# Patient Record
Sex: FEMALE | Race: WHITE | Employment: FULL TIME | URBAN - METROPOLITAN AREA
[De-identification: names, ages, dates, MRNs, and addresses within clinical notes are randomized per-mention and may not be internally consistent; named-entity substitution may affect disease eponyms.]

---

## 2021-02-04 LAB — PAP SMEAR, EXTERNAL: NORMAL

## 2022-08-27 LAB
CREATININE, EXTERNAL: 0.79
HBA1C MFR BLD HPLC: 4.8 %
LDL-C, EXTERNAL: 92
TOTAL CHOLESTEROL, NCHOLT: 153

## 2023-01-31 ENCOUNTER — OFFICE VISIT (OUTPATIENT)
Dept: INTERNAL MEDICINE CLINIC | Age: 28
End: 2023-01-31
Payer: COMMERCIAL

## 2023-01-31 VITALS
RESPIRATION RATE: 16 BRPM | HEART RATE: 140 BPM | WEIGHT: 186.13 LBS | OXYGEN SATURATION: 99 % | TEMPERATURE: 98.6 F | BODY MASS INDEX: 31.78 KG/M2 | DIASTOLIC BLOOD PRESSURE: 80 MMHG | SYSTOLIC BLOOD PRESSURE: 130 MMHG | HEIGHT: 64 IN

## 2023-01-31 DIAGNOSIS — F33.2 ENDOGENOUS DEPRESSION (HCC): ICD-10-CM

## 2023-01-31 DIAGNOSIS — K58.2 IRRITABLE BOWEL SYNDROME WITH BOTH CONSTIPATION AND DIARRHEA: ICD-10-CM

## 2023-01-31 DIAGNOSIS — I10 ESSENTIAL HYPERTENSION: ICD-10-CM

## 2023-01-31 DIAGNOSIS — R16.1 SPLENOMEGALY: ICD-10-CM

## 2023-01-31 DIAGNOSIS — R59.9 NODULAR LYMPHOID HYPERPLASIA: ICD-10-CM

## 2023-01-31 DIAGNOSIS — R00.0 SINUS TACHYCARDIA: Primary | ICD-10-CM

## 2023-01-31 DIAGNOSIS — D80.2 IGA DEFICIENCY (HCC): ICD-10-CM

## 2023-01-31 PROBLEM — K58.9 IBS (IRRITABLE BOWEL SYNDROME): Status: ACTIVE | Noted: 2023-01-31

## 2023-01-31 PROCEDURE — 3079F DIAST BP 80-89 MM HG: CPT | Performed by: INTERNAL MEDICINE

## 2023-01-31 PROCEDURE — 3075F SYST BP GE 130 - 139MM HG: CPT | Performed by: INTERNAL MEDICINE

## 2023-01-31 PROCEDURE — 99214 OFFICE O/P EST MOD 30 MIN: CPT | Performed by: INTERNAL MEDICINE

## 2023-01-31 RX ORDER — LABETALOL 100 MG/1
100 TABLET, FILM COATED ORAL 2 TIMES DAILY
Qty: 180 TABLET | Refills: 1 | Status: SHIPPED | OUTPATIENT
Start: 2023-01-31

## 2023-01-31 RX ORDER — BUPROPION HYDROCHLORIDE 150 MG/1
150 TABLET ORAL DAILY
COMMUNITY
End: 2023-01-31 | Stop reason: SDUPTHER

## 2023-01-31 RX ORDER — NIFEDIPINE 60 MG/1
60 TABLET, EXTENDED RELEASE ORAL DAILY
COMMUNITY

## 2023-01-31 RX ORDER — ALBUTEROL SULFATE 90 UG/1
2 AEROSOL, METERED RESPIRATORY (INHALATION)
COMMUNITY

## 2023-01-31 RX ORDER — ONDANSETRON 4 MG/1
4 TABLET, FILM COATED ORAL
COMMUNITY

## 2023-01-31 RX ORDER — LABETALOL 100 MG/1
100 TABLET, FILM COATED ORAL 2 TIMES DAILY
COMMUNITY
End: 2023-01-31 | Stop reason: SDUPTHER

## 2023-01-31 RX ORDER — BUPROPION HYDROCHLORIDE 150 MG/1
150 TABLET ORAL DAILY
Qty: 90 TABLET | Refills: 1 | Status: SHIPPED | OUTPATIENT
Start: 2023-01-31

## 2023-01-31 NOTE — PROGRESS NOTES
[address] [facility] Internal Medicine  [address]  [place], [address] [facility]  Phone: [phone]      Nazanin Lind (: [birth year]) is a 28 y.o. female, established patient, here for evaluation of the following chief complaint(s):  Hospital Follow Up [facility]; elevated heart rate/ SOB.)         SUBJECTIVE/OBJECTIVE:  HPI:  Nazanin Lind is being seen today for ER follow up. She went to [facility] last week for elevated heart rate with SOB. She states she went to [facility] and then was sent to ER due to heart rate not going down. She states she feels ok. She does have a small headache. She is trying to cut out caffeine so is unsure if that is what the problem is. She needs refills on Wellbutrin and Labetalol. Has not been taking labetalol for 2 3 months, planning to get pregnant. Patient also has been feeling like heart racing for the last couple of weeks. Patient states in the emergency room patient was given IV fluid heart rate initially was 148. It did come down to 88 patient had a COVID test and also influenza test which was negative. Chest x-ray was no evidence of acute abnormality. White count was 10.3. Hemoglobin 15. Platelet count 257. TSH was checked which was 3.03. Troponin was negative. Sodium was 137. Potassium 3.6. Blood sugar was 94. BUN/creatinine normal.  Urine analysis negative. Patient had a EKG which showed sinus tachycardia ventricular rate 118. No acute process. Blood pressure was elevated at 154/98 in the emergency room. Prior to Admission medications    Medication Sig Start Date End Date Taking? Authorizing Provider   ondansetron hcl (ZOFRAN) 4 mg tablet Take 4 mg by mouth every six (6) hours as needed for Nausea or Vomiting. Yes Provider, Historical   albuterol (PROVENTIL HFA, VENTOLIN HFA, PROAIR HFA) 90 mcg/actuation inhaler Take 2 Puffs by inhalation.    Yes Provider, Historical   NIFEdipine ER (PROCARDIA XL) 60 mg ER tablet Take 60 mg by mouth daily. Yes Provider, Historical   buPROPion XL (WELLBUTRIN XL) 150 mg tablet Take 1 Tablet by mouth daily. 1/31/23  Yes Delmi Causey MD   labetaloL (NORMODYNE) 100 mg tablet Take 1 Tablet by mouth two (2) times a day. 1/31/23  Yes Delmi Causey MD        No Known Allergies     Past Medical History:   Diagnosis Date    Depression, endogenous ([facility] [address]     Hypertension, diastolic     IBS (irritable bowel syndrome)     IgA deficiency ([facility] [address]     Nodular lymphoid hyperplasia     Splenomegaly         Family History   Problem Relation Age of Onset    Diabetes Maternal Grandmother     Heart Disease Maternal Grandfather     Diabetes Maternal Grandfather     Heart Disease Paternal Grandmother     Diabetes Paternal Grandmother     Leukemia Paternal Grandmother     Heart Disease Paternal Grandfather         History reviewed. No pertinent surgical history. Review of Systems   Constitutional:  Negative for chills and fever. HENT:  Negative for congestion, ear pain, nosebleeds, sinus pain, sore throat and tinnitus. Eyes:  Negative for redness. Respiratory:  Negative for cough and shortness of breath. Cardiovascular:  Positive for palpitations. Negative for chest pain. Gastrointestinal:  Positive for constipation and diarrhea. Negative for abdominal pain, nausea and vomiting. Endocrine: Negative for cold intolerance and polyuria. Genitourinary:  Negative for dysuria and hematuria. Musculoskeletal:  Negative for back pain and neck pain. Skin:  Negative for rash. Neurological:  Negative for dizziness and headaches. Psychiatric/Behavioral: Negative. /80 (BP Patient Position: Standing)   Pulse (!) 140   Temp 98.6 °F (37 °C) (Temporal)   Resp 16   Ht 5' 4\" (1.626 m)   Wt 186 lb 2 oz (84.4 kg)   SpO2 99%   BMI 31.95 kg/m²      Physical Exam  Vitals reviewed. Exam conducted with a chaperone present. Gen:  Not  in no acute distress. Well built and nourished.   HEENT:  Pink conjunctivae, SENTHIL, EOMI, hearing intact . Mouth: Moist mucous membranes. No TPC  Neck:  Supple, without masses, thyroid not enlarged  Resp:  No accessory muscle use, clear breath sounds without wheezes rales or rhonchi  Card:  No murmurs, normal S1, S2 without thrills, tachycardic, no bruits or peripheral edema  Abd:  Soft, non-tender, non-distended, normoactive bowel sounds are present, no palpable organomegaly and no detectable hernias  Lymph:  No cervical or inguinal adenopathy  Musc:  No cyanosis or clubbing. Gait: Normal  Skin:  No rashes or ulcers, skin turgor is good  Neuro: Alert and oriented x 3. Cranial nerves are grossly intact, no focal motor weakness, follows commands appropriately  Psych:  Good insight, mood normal.      ASSESSMENT/PLAN:  Below is the assessment and plan developed based on review of pertinent history, physical exam, labs, studies, and medications. 1. Sinus tachycardia  Comments:  R/o POTS. Offered apt with cardiologist. Wants to wait. Will restart labetalol. Emergency room records reviewed. Orders:  -     labetaloL (NORMODYNE) 100 mg tablet; Take 1 Tablet by mouth two (2) times a day., Normal, Disp-180 Tablet, R-1  2. Essential hypertension  Comments:  Blood pressure controlled today. If heart rate stays high can increase labetalol to 200 mg twice a day and come off Procardia. Advised to monitor blood pressu  3. Irritable bowel syndrome with both constipation and diarrhea  Comments:  Seems like a ongoing problem. Advised follow-up appointment with gastroenterologist.  4. Splenomegaly  Comments:  Most likely secondary to nodular lymphoid hyperplasia. Patient had ultrasound 9/22.  5. Endogenous depression ([facility] [address]  Comments:  Patient states she has been getting depressed recently. We will place her on Wellbutrin 150 mg. Orders:  -     buPROPion XL (WELLBUTRIN XL) 150 mg tablet; Take 1 Tablet by mouth daily. , Normal, Disp-90 Tablet, R-1  6. IgA deficiency ([facility] [address]  7.  Nodular lymphoid hyperplasia    Return in about 4 weeks (around 2/28/2023) for follow up. Patient Instructions   Advised to monitor blood pressure at home. If blood pressure systolic is less than 799, to hold nifedipine. Can increase labetalol to 200 mg twice a day if heart rate is still staying high. Health Maintenance Due   Topic Date Due    Hepatitis C Screening  Never done    Depression Monitoring  Never done    DTaP/Tdap/Td series (1 - Tdap) Never done    Pap Smear  Never done    COVID-19 Vaccine (4 - Booster for Pfizer series) 03/26/2022        Aspects of this note may have been generated using voice recognition software. Despite editing, there may be unrecognized errors. An electronic signature was used to authenticate this note.   -- Cass Nazario MD

## 2023-01-31 NOTE — PATIENT INSTRUCTIONS
Advised to monitor blood pressure at home. If blood pressure systolic is less than 933, to hold nifedipine. Can increase labetalol to 200 mg twice a day if heart rate is still staying high.

## 2023-01-31 NOTE — PROGRESS NOTES
1. \"Have you been to the ER, urgent care clinic since your last visit? Hospitalized since your last visit? \" Yes Where: [facility], SOB and elevated heart rate    2. \"Have you seen or consulted any other health care providers outside of the [address] [place] [address] since your last visit? \" No

## 2023-08-14 DIAGNOSIS — R00.0 TACHYCARDIA, UNSPECIFIED: ICD-10-CM

## 2023-08-14 RX ORDER — LABETALOL 100 MG/1
TABLET, FILM COATED ORAL
Qty: 180 TABLET | Refills: 0 | Status: SHIPPED | OUTPATIENT
Start: 2023-08-14

## 2023-08-15 DIAGNOSIS — F33.2 MAJOR DEPRESSIVE DISORDER, RECURRENT SEVERE WITHOUT PSYCHOTIC FEATURES (HCC): ICD-10-CM

## 2023-08-15 RX ORDER — BUPROPION HYDROCHLORIDE 150 MG/1
TABLET ORAL
Qty: 90 TABLET | Refills: 0 | Status: SHIPPED | OUTPATIENT
Start: 2023-08-15

## 2023-11-20 DIAGNOSIS — R00.0 TACHYCARDIA, UNSPECIFIED: ICD-10-CM

## 2023-11-20 RX ORDER — LABETALOL 100 MG/1
100 TABLET, FILM COATED ORAL 2 TIMES DAILY
Qty: 60 TABLET | Refills: 0 | Status: SHIPPED | OUTPATIENT
Start: 2023-11-20 | End: 2023-12-19 | Stop reason: SDUPTHER

## 2023-11-21 DIAGNOSIS — F33.2 MAJOR DEPRESSIVE DISORDER, RECURRENT SEVERE WITHOUT PSYCHOTIC FEATURES (HCC): ICD-10-CM

## 2023-11-21 RX ORDER — BUPROPION HYDROCHLORIDE 150 MG/1
TABLET ORAL
Qty: 30 TABLET | Refills: 0 | Status: SHIPPED | OUTPATIENT
Start: 2023-11-21 | End: 2023-12-19 | Stop reason: SDUPTHER

## 2023-12-20 DIAGNOSIS — F33.2 MAJOR DEPRESSIVE DISORDER, RECURRENT SEVERE WITHOUT PSYCHOTIC FEATURES (HCC): ICD-10-CM

## 2023-12-20 RX ORDER — BUPROPION HYDROCHLORIDE 150 MG/1
150 TABLET ORAL DAILY
Qty: 90 TABLET | Refills: 0 | OUTPATIENT
Start: 2023-12-20

## 2024-01-08 ENCOUNTER — OFFICE VISIT (OUTPATIENT)
Facility: CLINIC | Age: 29
End: 2024-01-08
Payer: COMMERCIAL

## 2024-01-08 VITALS
RESPIRATION RATE: 16 BRPM | OXYGEN SATURATION: 99 % | DIASTOLIC BLOOD PRESSURE: 87 MMHG | BODY MASS INDEX: 36.19 KG/M2 | HEIGHT: 64 IN | HEART RATE: 83 BPM | TEMPERATURE: 98.4 F | SYSTOLIC BLOOD PRESSURE: 125 MMHG | WEIGHT: 212 LBS

## 2024-01-08 DIAGNOSIS — R59.9 NODULAR LYMPHOID HYPERPLASIA: ICD-10-CM

## 2024-01-08 DIAGNOSIS — D80.2 IGA DEFICIENCY (HCC): ICD-10-CM

## 2024-01-08 DIAGNOSIS — F33.2 DEPRESSION, ENDOGENOUS (HCC): ICD-10-CM

## 2024-01-08 DIAGNOSIS — R16.1 SPLENOMEGALY: ICD-10-CM

## 2024-01-08 DIAGNOSIS — Z11.59 NEED FOR HEPATITIS C SCREENING TEST: ICD-10-CM

## 2024-01-08 DIAGNOSIS — I10 HYPERTENSION, DIASTOLIC: Primary | ICD-10-CM

## 2024-01-08 DIAGNOSIS — K58.2 IRRITABLE BOWEL SYNDROME WITH BOTH CONSTIPATION AND DIARRHEA: ICD-10-CM

## 2024-01-08 DIAGNOSIS — M25.50 POLYARTHRALGIA: ICD-10-CM

## 2024-01-08 DIAGNOSIS — Z00.01 ENCOUNTER FOR PREVENTATIVE ADULT HEALTH CARE EXAM WITH ABNORMAL FINDINGS: ICD-10-CM

## 2024-01-08 PROCEDURE — 3074F SYST BP LT 130 MM HG: CPT | Performed by: INTERNAL MEDICINE

## 2024-01-08 PROCEDURE — 99214 OFFICE O/P EST MOD 30 MIN: CPT | Performed by: INTERNAL MEDICINE

## 2024-01-08 PROCEDURE — 3079F DIAST BP 80-89 MM HG: CPT | Performed by: INTERNAL MEDICINE

## 2024-01-08 SDOH — ECONOMIC STABILITY: FOOD INSECURITY: WITHIN THE PAST 12 MONTHS, THE FOOD YOU BOUGHT JUST DIDN'T LAST AND YOU DIDN'T HAVE MONEY TO GET MORE.: NEVER TRUE

## 2024-01-08 SDOH — ECONOMIC STABILITY: FOOD INSECURITY: WITHIN THE PAST 12 MONTHS, YOU WORRIED THAT YOUR FOOD WOULD RUN OUT BEFORE YOU GOT MONEY TO BUY MORE.: NEVER TRUE

## 2024-01-08 SDOH — ECONOMIC STABILITY: HOUSING INSECURITY
IN THE LAST 12 MONTHS, WAS THERE A TIME WHEN YOU DID NOT HAVE A STEADY PLACE TO SLEEP OR SLEPT IN A SHELTER (INCLUDING NOW)?: NO

## 2024-01-08 SDOH — ECONOMIC STABILITY: INCOME INSECURITY: HOW HARD IS IT FOR YOU TO PAY FOR THE VERY BASICS LIKE FOOD, HOUSING, MEDICAL CARE, AND HEATING?: NOT HARD AT ALL

## 2024-01-08 ASSESSMENT — PATIENT HEALTH QUESTIONNAIRE - PHQ9
9. THOUGHTS THAT YOU WOULD BE BETTER OFF DEAD, OR OF HURTING YOURSELF: 0
SUM OF ALL RESPONSES TO PHQ QUESTIONS 1-9: 13
7. TROUBLE CONCENTRATING ON THINGS, SUCH AS READING THE NEWSPAPER OR WATCHING TELEVISION: 2
5. POOR APPETITE OR OVEREATING: 1
6. FEELING BAD ABOUT YOURSELF - OR THAT YOU ARE A FAILURE OR HAVE LET YOURSELF OR YOUR FAMILY DOWN: 1
SUM OF ALL RESPONSES TO PHQ QUESTIONS 1-9: 13
8. MOVING OR SPEAKING SO SLOWLY THAT OTHER PEOPLE COULD HAVE NOTICED. OR THE OPPOSITE, BEING SO FIGETY OR RESTLESS THAT YOU HAVE BEEN MOVING AROUND A LOT MORE THAN USUAL: 0
10. IF YOU CHECKED OFF ANY PROBLEMS, HOW DIFFICULT HAVE THESE PROBLEMS MADE IT FOR YOU TO DO YOUR WORK, TAKE CARE OF THINGS AT HOME, OR GET ALONG WITH OTHER PEOPLE: 2
3. TROUBLE FALLING OR STAYING ASLEEP: 3
SUM OF ALL RESPONSES TO PHQ9 QUESTIONS 1 & 2: 3
4. FEELING TIRED OR HAVING LITTLE ENERGY: 3
1. LITTLE INTEREST OR PLEASURE IN DOING THINGS: 2
2. FEELING DOWN, DEPRESSED OR HOPELESS: 1

## 2024-01-08 ASSESSMENT — ENCOUNTER SYMPTOMS
CONSTIPATION: 1
DIARRHEA: 1
COUGH: 0
VOMITING: 0
NAUSEA: 1
ABDOMINAL PAIN: 0
SHORTNESS OF BREATH: 0

## 2024-01-08 NOTE — PROGRESS NOTES
Chief Complaint   Patient presents with    Fever     started yesterday    Sore Throat    Generalized Body Aches     she is a 9y.o. year old female who presents for evalution. Symptoms started yesterday fever and body aches. she has not coughed while I the office today  She is acting normal today  There is a lot of flu in her school so mom brought her to be tested     Reviewed PmHx, RxHx, FmHx, SocHx, AllgHx and updated and dated in the chart. Aspirin yes ____   No____ N/A____    Patient Active Problem List    Diagnosis    History of strep pharyngitis    Environmental and seasonal allergies    Dehydration     Acute Gastroenteritis and hospitalization 4/2016      GERD (gastroesophageal reflux disease)       Nurse notes were reviewed and copied and are correct  Review of Systems - negative except as listed above in the HPI    Objective:     Vitals:    03/02/19 1013   BP: 119/78   Pulse: 106   Resp: 22   Temp: 99 °F (37.2 °C)   TempSrc: Oral   SpO2: 99%   Weight: 53 lb (24 kg)   Height: (!) 4' 0.43\" (1.23 m)     Physical Examination: General appearance - alert, well appearing, and in no distress  Mental status - alert, oriented to person, place, and time  Ears - bilateral TM's and external ear canals normal  Mouth - mucous membranes moist, pharynx normal without lesions  Neck - supple, no significant adenopathy  Chest - clear to auscultation, no wheezes, rales or rhonchi, symmetric air entry  Heart - normal rate, regular rhythm, normal S1, S2, no murmurs, rubs, clicks or gallops         Assessment/ Plan:   Diagnoses and all orders for this visit:    1. Fever, unspecified fever cause  -     AMB POC RAPID STREP A  -     AMB POC NASIM INFLUENZA A/B TEST     both tests were negative  No sign of bacterial infections  No fevetoday. I told mom to treat symptoms   rto if fever returns   Follow-up Disposition: Not on File    ICD-10-CM ICD-9-CM    1.  Fever, unspecified fever cause R50.9 780.60 AMB POC RAPID STREP A [facility]  [address], [place] [address]  Phone: [phone]      Shalonda Gann (: 1995) is a 28 y.o. female, established patient, here for evaluation of the following chief complaint(s):  FMLA forms, Follow-up Chronic Condition, and Hypertension         SUBJECTIVE/OBJECTIVE:  HPI:  This is a 28-year-old female with past medical history of hypertension, IgA deficiency, nodular lymphoid hyperplasia, splenomegaly, IBS, endogenous depression here for 1 year follow-up.Also needs to have  FMLA forms filled out. She says that she has stomach issues and some days she have diarrhea and other days it is constipation. She states that she also has days that she vomits. She says that it is almost time for another endoscopy. She states that she needs intermittent leave for flare ups. She states that she has flare ups 7 days a month 8 hours and also needs 15 minute breaks 3 x a day. Patient states that she is having pain in her hands and she pops her fingers a lot and they get stuck sometimes. She needs a refills on all medications and needs 90 day supply.LMP 2024        Current Outpatient Medications   Medication Sig    buPROPion (WELLBUTRIN XL) 150 MG extended release tablet Take 1 tablet by mouth daily    labetalol (NORMODYNE) 100 MG tablet Take 1 tablet by mouth 2 times daily    albuterol sulfate HFA (PROVENTIL;VENTOLIN;PROAIR) 108 (90 Base) MCG/ACT inhaler Inhale 2 puffs into the lungs    ondansetron (ZOFRAN) 4 MG tablet Take 1 tablet by mouth every 6 hours as needed     No current facility-administered medications for this visit.       No Known Allergies     Past Medical History:   Diagnosis Date    Depression, endogenous (HCC)     Hypertension, diastolic     IBS (irritable bowel syndrome)     IgA deficiency (HCC)     Nodular lymphoid hyperplasia     Splenomegaly         Family History   Problem Relation Age of Onset    Heart Disease Paternal  AMB POC NASIM INFLUENZA A/B TEST       I have discussed the diagnosis with the patient and the intended plan as seen in the above orders. The patient has received an after-visit summary and questions were answered concerning future plans. Medication Side Effects and Warnings were discussed with patient: yes  Patient Labs were reviewed and or requested: yes  Patient Past Records were reviewed and or requested: yes        There are no Patient Instructions on file for this visit.     The patient verbalizes understanding and agrees with the plan of care        Patient has the advanced directives booklet to review

## 2024-01-08 NOTE — PATIENT INSTRUCTIONS
Patient advised to have low sodium diet, daily exercise, and increase daily fruit and vegetable intake.   Importance of weight reduction discussed with patient.

## 2024-01-14 LAB
ALBUMIN SERPL-MCNC: 4.7 G/DL (ref 4–5)
ALBUMIN/GLOB SERPL: 2 {RATIO} (ref 1.2–2.2)
ALP SERPL-CCNC: 73 IU/L (ref 44–121)
ALT SERPL-CCNC: 7 IU/L (ref 0–32)
AST SERPL-CCNC: 9 IU/L (ref 0–40)
BASOPHILS # BLD AUTO: 0 X10E3/UL (ref 0–0.2)
BASOPHILS NFR BLD AUTO: 1 %
BILIRUB SERPL-MCNC: 0.3 MG/DL (ref 0–1.2)
BUN SERPL-MCNC: 10 MG/DL (ref 6–20)
BUN/CREAT SERPL: 14 (ref 9–23)
CALCIUM SERPL-MCNC: 9.7 MG/DL (ref 8.7–10.2)
CHLORIDE SERPL-SCNC: 104 MMOL/L (ref 96–106)
CHOLEST SERPL-MCNC: 155 MG/DL (ref 100–199)
CO2 SERPL-SCNC: 23 MMOL/L (ref 20–29)
CREAT SERPL-MCNC: 0.72 MG/DL (ref 0.57–1)
EGFRCR SERPLBLD CKD-EPI 2021: 116 ML/MIN/1.73
EOSINOPHIL # BLD AUTO: 0.2 X10E3/UL (ref 0–0.4)
EOSINOPHIL NFR BLD AUTO: 3 %
ERYTHROCYTE [DISTWIDTH] IN BLOOD BY AUTOMATED COUNT: 12.2 % (ref 11.7–15.4)
ERYTHROCYTE [SEDIMENTATION RATE] IN BLOOD BY WESTERGREN METHOD: 2 MM/HR (ref 0–32)
GLOBULIN SER CALC-MCNC: 2.3 G/DL (ref 1.5–4.5)
GLUCOSE SERPL-MCNC: 92 MG/DL (ref 70–99)
HCT VFR BLD AUTO: 39.7 % (ref 34–46.6)
HDLC SERPL-MCNC: 36 MG/DL
HGB BLD-MCNC: 13.2 G/DL (ref 11.1–15.9)
IMM GRANULOCYTES # BLD AUTO: 0 X10E3/UL (ref 0–0.1)
IMM GRANULOCYTES NFR BLD AUTO: 0 %
LDLC SERPL CALC-MCNC: 94 MG/DL (ref 0–99)
LYMPHOCYTES # BLD AUTO: 1.6 X10E3/UL (ref 0.7–3.1)
LYMPHOCYTES NFR BLD AUTO: 30 %
MCH RBC QN AUTO: 29.5 PG (ref 26.6–33)
MCHC RBC AUTO-ENTMCNC: 33.2 G/DL (ref 31.5–35.7)
MCV RBC AUTO: 89 FL (ref 79–97)
MONOCYTES # BLD AUTO: 0.3 X10E3/UL (ref 0.1–0.9)
MONOCYTES NFR BLD AUTO: 5 %
NEUTROPHILS # BLD AUTO: 3.4 X10E3/UL (ref 1.4–7)
NEUTROPHILS NFR BLD AUTO: 61 %
PLATELET # BLD AUTO: 296 X10E3/UL (ref 150–450)
POTASSIUM SERPL-SCNC: 4.4 MMOL/L (ref 3.5–5.2)
RBC # BLD AUTO: 4.48 X10E6/UL (ref 3.77–5.28)
SODIUM SERPL-SCNC: 140 MMOL/L (ref 134–144)
TRIGL SERPL-MCNC: 138 MG/DL (ref 0–149)
TSH SERPL DL<=0.005 MIU/L-ACNC: 2.57 UIU/ML (ref 0.45–4.5)
VLDLC SERPL CALC-MCNC: 25 MG/DL (ref 5–40)
WBC # BLD AUTO: 5.5 X10E3/UL (ref 3.4–10.8)

## 2024-01-15 LAB
APPEARANCE UR: CLEAR
BACTERIA #/AREA URNS HPF: NORMAL /[HPF]
BILIRUB UR QL STRIP: NEGATIVE
CASTS URNS QL MICRO: NORMAL /LPF
COLOR UR: YELLOW
CRP SERPL-MCNC: 3 MG/L (ref 0–10)
EPI CELLS #/AREA URNS HPF: NORMAL /HPF (ref 0–10)
GLUCOSE UR QL STRIP: NEGATIVE
HBA1C MFR BLD: 5 % (ref 4.8–5.6)
HGB UR QL STRIP: NEGATIVE
KETONES UR QL STRIP: NEGATIVE
LEUKOCYTE ESTERASE UR QL STRIP: NEGATIVE
MICRO URNS: NORMAL
MICRO URNS: NORMAL
NITRITE UR QL STRIP: NEGATIVE
PH UR STRIP: 6.5 [PH] (ref 5–7.5)
PROT UR QL STRIP: NEGATIVE
RBC #/AREA URNS HPF: NORMAL /HPF (ref 0–2)
SP GR UR STRIP: 1.02 (ref 1–1.03)
UROBILINOGEN UR STRIP-MCNC: 0.2 MG/DL (ref 0.2–1)
WBC #/AREA URNS HPF: NORMAL /HPF (ref 0–5)

## 2024-01-16 LAB
ANA SER QL IF: NEGATIVE
CCP IGA+IGG SERPL IA-ACNC: 9 UNITS (ref 0–19)
LABORATORY COMMENT REPORT: NORMAL

## 2024-01-19 LAB
ALBUMIN SERPL ELPH-MCNC: 4.3 G/DL (ref 2.9–4.4)
ALBUMIN/GLOB SERPL: 1.6 {RATIO} (ref 0.7–1.7)
ALPHA1 GLOB SERPL ELPH-MCNC: 0.2 G/DL (ref 0–0.4)
ALPHA2 GLOB SERPL ELPH-MCNC: 0.6 G/DL (ref 0.4–1)
B-GLOBULIN SERPL ELPH-MCNC: 0.9 G/DL (ref 0.7–1.3)
GAMMA GLOB SERPL ELPH-MCNC: 1 G/DL (ref 0.4–1.8)
GLOBULIN SER-MCNC: 2.7 G/DL (ref 2.2–3.9)
IGA SERPL-MCNC: <5 MG/DL (ref 87–352)
IGG SERPL-MCNC: 1037 MG/DL (ref 586–1602)
IGM SERPL-MCNC: 42 MG/DL (ref 26–217)
INTERPRETATION SERPL IEP-IMP: ABNORMAL
KAPPA LC FREE SER-MCNC: 8.6 MG/L (ref 3.3–19.4)
KAPPA LC FREE/LAMBDA FREE SER: 1.28 {RATIO} (ref 0.26–1.65)
LABORATORY COMMENT REPORT: ABNORMAL
LAMBDA LC FREE SERPL-MCNC: 6.7 MG/L (ref 5.7–26.3)
M PROTEIN SERPL ELPH-MCNC: ABNORMAL G/DL
PROT SERPL-MCNC: 7 G/DL (ref 6–8.5)

## 2024-01-19 NOTE — RESULT ENCOUNTER NOTE
Please let patient know IgA level is less than 5.  She should get follow-up appointment with Dr. Temo LOPEZ.  Please tell her to call and make that appointment

## 2024-01-28 DIAGNOSIS — F33.2 MAJOR DEPRESSIVE DISORDER, RECURRENT SEVERE WITHOUT PSYCHOTIC FEATURES (HCC): ICD-10-CM

## 2024-01-28 DIAGNOSIS — R00.0 TACHYCARDIA, UNSPECIFIED: ICD-10-CM

## 2024-01-28 RX ORDER — BUPROPION HYDROCHLORIDE 150 MG/1
150 TABLET ORAL DAILY
Qty: 30 TABLET | Refills: 0 | Status: SHIPPED | OUTPATIENT
Start: 2024-01-28

## 2024-01-28 RX ORDER — LABETALOL 100 MG/1
100 TABLET, FILM COATED ORAL 2 TIMES DAILY
Qty: 60 TABLET | Refills: 0 | Status: SHIPPED | OUTPATIENT
Start: 2024-01-28

## 2024-02-19 ENCOUNTER — TELEPHONE (OUTPATIENT)
Facility: CLINIC | Age: 29
End: 2024-02-19

## 2024-02-19 ENCOUNTER — OFFICE VISIT (OUTPATIENT)
Facility: CLINIC | Age: 29
End: 2024-02-19
Payer: COMMERCIAL

## 2024-02-19 VITALS
OXYGEN SATURATION: 97 % | DIASTOLIC BLOOD PRESSURE: 96 MMHG | HEIGHT: 64 IN | WEIGHT: 205 LBS | BODY MASS INDEX: 35 KG/M2 | RESPIRATION RATE: 16 BRPM | SYSTOLIC BLOOD PRESSURE: 140 MMHG | HEART RATE: 98 BPM

## 2024-02-19 DIAGNOSIS — R11.2 NAUSEA AND VOMITING, UNSPECIFIED VOMITING TYPE: ICD-10-CM

## 2024-02-19 DIAGNOSIS — R59.9 NODULAR LYMPHOID HYPERPLASIA: ICD-10-CM

## 2024-02-19 DIAGNOSIS — F41.8 MIXED ANXIETY AND DEPRESSIVE DISORDER: ICD-10-CM

## 2024-02-19 DIAGNOSIS — R00.0 TACHYCARDIA, UNSPECIFIED: ICD-10-CM

## 2024-02-19 DIAGNOSIS — I10 ESSENTIAL HYPERTENSION, BENIGN: Primary | ICD-10-CM

## 2024-02-19 DIAGNOSIS — D80.2 IGA DEFICIENCY (HCC): ICD-10-CM

## 2024-02-19 PROCEDURE — 99214 OFFICE O/P EST MOD 30 MIN: CPT | Performed by: INTERNAL MEDICINE

## 2024-02-19 PROCEDURE — 3080F DIAST BP >= 90 MM HG: CPT | Performed by: INTERNAL MEDICINE

## 2024-02-19 PROCEDURE — 3075F SYST BP GE 130 - 139MM HG: CPT | Performed by: INTERNAL MEDICINE

## 2024-02-19 RX ORDER — PANTOPRAZOLE SODIUM 40 MG/1
40 TABLET, DELAYED RELEASE ORAL
Qty: 90 TABLET | Refills: 1 | Status: SHIPPED | OUTPATIENT
Start: 2024-02-19

## 2024-02-19 RX ORDER — LABETALOL 100 MG/1
100 TABLET, FILM COATED ORAL 3 TIMES DAILY
Qty: 90 TABLET | Refills: 3 | Status: SHIPPED | OUTPATIENT
Start: 2024-02-19

## 2024-02-19 RX ORDER — BUPROPION HYDROCHLORIDE 150 MG/1
150 TABLET ORAL DAILY
Qty: 30 TABLET | Refills: 0 | Status: SHIPPED | OUTPATIENT
Start: 2024-02-19 | End: 2024-02-20 | Stop reason: SDUPTHER

## 2024-02-19 RX ORDER — ONDANSETRON 4 MG/1
4 TABLET, FILM COATED ORAL EVERY 8 HOURS PRN
Qty: 45 TABLET | Refills: 1 | Status: SHIPPED | OUTPATIENT
Start: 2024-02-19

## 2024-02-19 ASSESSMENT — ENCOUNTER SYMPTOMS
ABDOMINAL PAIN: 0
DIARRHEA: 0
VOMITING: 1
NAUSEA: 1
SHORTNESS OF BREATH: 0
COUGH: 0

## 2024-02-19 NOTE — TELEPHONE ENCOUNTER
Pt states Dr. Plascencia left the practice in July. States they told her she could be referred to Dr. Tamez. Need new referral .SILVANO CUMMINGS MA

## 2024-02-19 NOTE — PROGRESS NOTES
1. \"Have you been to the ER, urgent care clinic since your last visit?  Hospitalized since your last visit?\" No    2. \"Have you seen or consulted any other health care providers outside of the [facility] since your last visit?\" No     3. For patients aged 45-75: Has the patient had a colonoscopy / FIT/ Cologuard? NA - based on age      If the patient is female:    4. For patients aged 40-74: Has the patient had a mammogram within the past 2 years? NA - based on age or sex      5. For patients aged 21-65: Has the patient had a pap smear?    
fatigue. Negative for appetite change.   Eyes:  Negative for visual disturbance.   Respiratory:  Negative for cough and shortness of breath.    Cardiovascular:  Negative for chest pain and leg swelling.   Gastrointestinal:  Positive for nausea and vomiting. Negative for abdominal pain and diarrhea.   Genitourinary:  Negative for dysuria.   Skin:  Negative for rash.   Neurological:  Negative for tremors and headaches.   Psychiatric/Behavioral:  The patient is nervous/anxious.        BP (!) 140/96   Pulse 98   Resp 16   Ht 1.626 m (5' 4\")   Wt 93 kg (205 lb)   SpO2 97%   BMI 35.19 kg/m²      Physical Exam  Vitals reviewed.   Gen:  Not  in no acute distress. Well built and nourished.Sickly  HEENT:  Pink conjunctivae, JUNIE, EOMI, hearing intact .  Mouth: Moist mucous membranes. No TPC  Neck:  Supple, without masses, thyroid not enlarged  Resp:  No accessory muscle use, clear breath sounds without wheezes rales or rhonchi  Card:  No murmurs, normal S1, S2 without thrills, no bruits or peripheral edema  Abd:  Soft, non-tender, non-distended, normoactive bowel sounds are present, no palpable organomegaly and no detectable hernias  Lymph:  No cervical or inguinal adenopathy  Musc:  No cyanosis or clubbing.   Gait: Normal  Skin:  No rashes or ulcers, skin turgor is good  Neuro: Alert and oriented x 3. Cranial nerves are grossly intact, no focal motor weakness, follows commands appropriately  Psych:  Good insight, mood normal.  ASSESSMENT/PLAN:  Below is the assessment and plan developed based on review of pertinent history, physical exam, labs, studies, and medications.    1. Essential hypertension, benign  Comments:  Blood pressure elevated today.  Will increase labetalol to 100 mg 3 times a day.  Orders:  -     labetalol (NORMODYNE) 100 MG tablet; Take 1 tablet by mouth 3 times daily, Disp-90 tablet, R-3Please ask patient to call office and make appointmentNormal  2. Nodular lymphoid hyperplasia  Comments:  Patient

## 2024-02-20 DIAGNOSIS — F41.8 MIXED ANXIETY AND DEPRESSIVE DISORDER: ICD-10-CM

## 2024-02-20 DIAGNOSIS — D80.2 IGA DEFICIENCY (HCC): Primary | ICD-10-CM

## 2024-02-20 DIAGNOSIS — R59.9 NODULAR LYMPHOID HYPERPLASIA: ICD-10-CM

## 2024-02-20 RX ORDER — BUPROPION HYDROCHLORIDE 300 MG/1
300 TABLET ORAL DAILY
Qty: 90 TABLET | Refills: 1 | Status: SHIPPED | OUTPATIENT
Start: 2024-02-20

## 2024-03-08 DIAGNOSIS — F41.8 MIXED ANXIETY AND DEPRESSIVE DISORDER: ICD-10-CM

## 2024-03-08 RX ORDER — BUPROPION HYDROCHLORIDE 300 MG/1
300 TABLET ORAL DAILY
Qty: 90 TABLET | Refills: 1 | Status: SHIPPED | OUTPATIENT
Start: 2024-03-08

## 2024-03-24 DIAGNOSIS — I10 ESSENTIAL HYPERTENSION, BENIGN: ICD-10-CM

## 2024-03-24 RX ORDER — LABETALOL 100 MG/1
100 TABLET, FILM COATED ORAL 3 TIMES DAILY
Qty: 270 TABLET | Refills: 1 | Status: SHIPPED | OUTPATIENT
Start: 2024-03-24

## 2024-04-23 ENCOUNTER — OFFICE VISIT (OUTPATIENT)
Facility: CLINIC | Age: 29
End: 2024-04-23
Payer: COMMERCIAL

## 2024-04-23 VITALS
HEIGHT: 64 IN | SYSTOLIC BLOOD PRESSURE: 125 MMHG | OXYGEN SATURATION: 98 % | RESPIRATION RATE: 16 BRPM | TEMPERATURE: 97.9 F | BODY MASS INDEX: 35.85 KG/M2 | WEIGHT: 210 LBS | DIASTOLIC BLOOD PRESSURE: 86 MMHG | HEART RATE: 91 BPM

## 2024-04-23 DIAGNOSIS — K58.2 IRRITABLE BOWEL SYNDROME WITH BOTH CONSTIPATION AND DIARRHEA: ICD-10-CM

## 2024-04-23 DIAGNOSIS — I10 HYPERTENSION, DIASTOLIC: Primary | ICD-10-CM

## 2024-04-23 DIAGNOSIS — E78.6 LOW HDL (UNDER 40): ICD-10-CM

## 2024-04-23 DIAGNOSIS — D80.2 IGA DEFICIENCY (HCC): ICD-10-CM

## 2024-04-23 DIAGNOSIS — F41.8 MIXED ANXIETY AND DEPRESSIVE DISORDER: ICD-10-CM

## 2024-04-23 DIAGNOSIS — R59.9 NODULAR LYMPHOID HYPERPLASIA: ICD-10-CM

## 2024-04-23 DIAGNOSIS — R00.0 TACHYCARDIA, UNSPECIFIED: ICD-10-CM

## 2024-04-23 PROCEDURE — 3074F SYST BP LT 130 MM HG: CPT | Performed by: INTERNAL MEDICINE

## 2024-04-23 PROCEDURE — 3079F DIAST BP 80-89 MM HG: CPT | Performed by: INTERNAL MEDICINE

## 2024-04-23 PROCEDURE — 99214 OFFICE O/P EST MOD 30 MIN: CPT | Performed by: INTERNAL MEDICINE

## 2024-04-23 RX ORDER — DIVALPROEX SODIUM 250 MG/1
250 TABLET, EXTENDED RELEASE ORAL
COMMUNITY
Start: 2024-04-06

## 2024-04-23 ASSESSMENT — ENCOUNTER SYMPTOMS
DIARRHEA: 1
VOMITING: 0
NAUSEA: 1
CONSTIPATION: 1
SHORTNESS OF BREATH: 0
ABDOMINAL PAIN: 0
COUGH: 0

## 2024-04-23 NOTE — PROGRESS NOTES
[facility]  [address], [place] [address]  Phone: [phone]      Shalonda Gann (: 1995) is a 28 y.o. female, established patient, here for evaluation of the following chief complaint(s):  Follow-up Chronic Condition, Discuss Labs, Constipation, and Diarrhea         SUBJECTIVE/OBJECTIVE:  History of Present Illness  This is a 28-year-old female with past medical history of hypertension, IgA deficiency, nodular lymphoid hyperplasia, splenomegaly, IBS and endogenous depression, here for 2-month follow-up.    Patient reports an improvement in her condition, with fewer episodes of vomiting. However, she continues to experience alternating diarrhea and constipation, accompanied by cramping. Her Labetalol dosage was increased to 100 mg thrice daily during her last visit, with occasional missed doses. She has yet to consult with Dr. Plascencia, as her previous physician, Dr. Plascencia, has since left the practice. She has an upcoming appointment with Dr. Tamez in 2025. A consultation with  gastroenterologist is scheduled for 2024.  She continues to experience nausea, but the vomiting has somewhat subsided. Yesterday, she experienced 3 to 6 bowel movements, accompanied by cramping.    During her last visit, her Wellbutrin dosage was increased to 300 mg. She has been under the care of a therapist, who subsequently referred her to a psychiatrist, Amadou Jeffery, with  [facility]. Psychiatrist suspects she may be suffering from bipolar disorder, and discontinued her bupropion over a 2-week period and initiated divalproex therapy. She takes divalproex at night and is scheduled for a follow-up appointment with Amadou Jeffery tomorrow at 3:00 PM.  Psychiatrist is expected to communicate with us regarding the potential dosage adjustment or medication adjustments.T.    Supplemental Information  She feels lightheaded a lot. She feels heavy.         Hemoglobin A1C

## 2024-04-23 NOTE — PROGRESS NOTES
\"Have you been to the ER, urgent care clinic since your last visit?  Hospitalized since your last visit?\"    NO    “Have you seen or consulted any other health care providers outside of [facility] since your last visit?”    Dr. Amadou Santizo psychiatrist   Dr. Arellano therapist           Click Here for Release of Records Request

## 2024-07-31 ENCOUNTER — OFFICE VISIT (OUTPATIENT)
Facility: CLINIC | Age: 29
End: 2024-07-31
Payer: COMMERCIAL

## 2024-07-31 VITALS
DIASTOLIC BLOOD PRESSURE: 82 MMHG | WEIGHT: 220 LBS | HEIGHT: 64 IN | BODY MASS INDEX: 37.56 KG/M2 | TEMPERATURE: 98 F | RESPIRATION RATE: 16 BRPM | OXYGEN SATURATION: 96 % | SYSTOLIC BLOOD PRESSURE: 122 MMHG | HEART RATE: 92 BPM

## 2024-07-31 DIAGNOSIS — D80.2 IGA DEFICIENCY (HCC): ICD-10-CM

## 2024-07-31 DIAGNOSIS — I10 ESSENTIAL HYPERTENSION, BENIGN: Primary | ICD-10-CM

## 2024-07-31 DIAGNOSIS — K58.2 IRRITABLE BOWEL SYNDROME WITH BOTH CONSTIPATION AND DIARRHEA: ICD-10-CM

## 2024-07-31 DIAGNOSIS — J33.9 NASAL POLYPOSIS: ICD-10-CM

## 2024-07-31 DIAGNOSIS — E78.6 LOW HDL (UNDER 40): ICD-10-CM

## 2024-07-31 DIAGNOSIS — I10 ESSENTIAL HYPERTENSION, BENIGN: ICD-10-CM

## 2024-07-31 DIAGNOSIS — R59.9 NODULAR LYMPHOID HYPERPLASIA: ICD-10-CM

## 2024-07-31 DIAGNOSIS — R06.83 SNORING: ICD-10-CM

## 2024-07-31 DIAGNOSIS — F31.9 BIPOLAR 1 DISORDER, DEPRESSED (HCC): ICD-10-CM

## 2024-07-31 DIAGNOSIS — E66.01 SEVERE OBESITY (BMI 35.0-39.9) WITH COMORBIDITY (HCC): ICD-10-CM

## 2024-07-31 PROCEDURE — 99214 OFFICE O/P EST MOD 30 MIN: CPT | Performed by: INTERNAL MEDICINE

## 2024-07-31 PROCEDURE — 3079F DIAST BP 80-89 MM HG: CPT | Performed by: INTERNAL MEDICINE

## 2024-07-31 PROCEDURE — 3074F SYST BP LT 130 MM HG: CPT | Performed by: INTERNAL MEDICINE

## 2024-07-31 RX ORDER — ESCITALOPRAM OXALATE 10 MG/1
10 TABLET ORAL DAILY
COMMUNITY

## 2024-07-31 RX ORDER — DEXTROAMPHETAMINE SACCHARATE, AMPHETAMINE ASPARTATE, DEXTROAMPHETAMINE SULFATE AND AMPHETAMINE SULFATE 5; 5; 5; 5 MG/1; MG/1; MG/1; MG/1
20 TABLET ORAL DAILY
COMMUNITY

## 2024-07-31 RX ORDER — BUPROPION HYDROCHLORIDE 300 MG/1
300 TABLET ORAL EVERY MORNING
COMMUNITY

## 2024-07-31 ASSESSMENT — ENCOUNTER SYMPTOMS
CONSTIPATION: 1
COUGH: 0
ABDOMINAL PAIN: 1
NAUSEA: 1
VOMITING: 0
SHORTNESS OF BREATH: 0
DIARRHEA: 1

## 2024-07-31 NOTE — PROGRESS NOTES
[facility]  [address] [place] [address], [place] [address]  Phone: [phone]      Shalonda Gann (: 1995) is a 29 y.o. female, established patient, here for evaluation of the following chief complaint(s):  Follow-up Chronic Condition         SUBJECTIVE/OBJECTIVE:  History of Present Illness  The patient is a 29-year-old female with a past medical history of hypertension, IgA deficiency, nodular lymphoid hyperplasia, irritable bowel syndrome (IBS) with constipation and diarrhea, tachycardia, and depressive disorder. She is here for a 3-month follow-up.    She sought help from an ENT specialist due to persistent nasal issues. Nasal polyps were discovered, and she was prescribed prednisone 10 mg for 6 days. She is currently not on any medication. If the polyps have reduced in size, further exploration into potential allergy issues will be pursued to prevent recurrence.    She continues to participate in the [facility] program for her mental health. During her last visit, she was grappling with severe depression. She was initially prescribed Depakote, which has somewhat stabilized her condition and improved her sleep. However, she felt it was insufficient, leading to the addition of bupropion and Lexapro. Adderall was introduced a few weeks ago and its effectiveness is still being evaluated. She consults with her psychiatrist, Dr. Amadou Santizo, approximately once a month and attends group therapy sessions at [facility] on , , and . She has been diagnosed with bipolar disorder type 2, characterized by depression and anxiety.    Her irritable bowel syndrome (IBS) symptoms have slightly improved. She experiences less vomiting than before, but still suffers from discomfort, cramping, bloating, constipation, diarrhea, and nausea. Her diet primarily consists of evening meals, with minimal snacking before bedtime. She often feels unwell due

## 2024-07-31 NOTE — PROGRESS NOTES
\"Have you been to the ER, urgent care clinic since your last visit?  Hospitalized since your last visit?\"    NO    “Have you seen or consulted any other health care providers outside of [facility] since your last visit?”    YES - When: approximately 3 days ago.  Where and Why: [facility] [place]/ [place]. /Dr Santizo [facility]      “Have you had a pap smear?”    NO Appointment with OBGYN tomorrow    Date of last Cervical Cancer screen (HPV or PAP): 2/4/2021             Click Here for Release of Records Request

## 2024-08-01 LAB — PAP SMEAR, EXTERNAL: NORMAL

## 2024-08-10 LAB
ALBUMIN SERPL-MCNC: 4.6 G/DL (ref 4–5)
ALP SERPL-CCNC: 67 IU/L (ref 44–121)
ALT SERPL-CCNC: 8 IU/L (ref 0–32)
AST SERPL-CCNC: 9 IU/L (ref 0–40)
BASOPHILS # BLD AUTO: 0 X10E3/UL (ref 0–0.2)
BASOPHILS NFR BLD AUTO: 1 %
BILIRUB SERPL-MCNC: 1 MG/DL (ref 0–1.2)
BUN SERPL-MCNC: 9 MG/DL (ref 6–20)
BUN/CREAT SERPL: 11 (ref 9–23)
CALCIUM SERPL-MCNC: 9.4 MG/DL (ref 8.7–10.2)
CHLORIDE SERPL-SCNC: 104 MMOL/L (ref 96–106)
CHOLEST SERPL-MCNC: 186 MG/DL (ref 100–199)
CO2 SERPL-SCNC: 22 MMOL/L (ref 20–29)
CREAT SERPL-MCNC: 0.79 MG/DL (ref 0.57–1)
EGFRCR SERPLBLD CKD-EPI 2021: 103 ML/MIN/1.73
EOSINOPHIL # BLD AUTO: 0.2 X10E3/UL (ref 0–0.4)
EOSINOPHIL NFR BLD AUTO: 3 %
ERYTHROCYTE [DISTWIDTH] IN BLOOD BY AUTOMATED COUNT: 12.6 % (ref 11.7–15.4)
GLOBULIN SER CALC-MCNC: 2 G/DL (ref 1.5–4.5)
GLUCOSE SERPL-MCNC: 84 MG/DL (ref 70–99)
HCT VFR BLD AUTO: 39.9 % (ref 34–46.6)
HDLC SERPL-MCNC: 41 MG/DL
HGB BLD-MCNC: 13.1 G/DL (ref 11.1–15.9)
IMM GRANULOCYTES # BLD AUTO: 0 X10E3/UL (ref 0–0.1)
IMM GRANULOCYTES NFR BLD AUTO: 0 %
LDLC SERPL CALC-MCNC: 125 MG/DL (ref 0–99)
LYMPHOCYTES # BLD AUTO: 1.4 X10E3/UL (ref 0.7–3.1)
LYMPHOCYTES NFR BLD AUTO: 26 %
MCH RBC QN AUTO: 29.8 PG (ref 26.6–33)
MCHC RBC AUTO-ENTMCNC: 32.8 G/DL (ref 31.5–35.7)
MCV RBC AUTO: 91 FL (ref 79–97)
MONOCYTES # BLD AUTO: 0.3 X10E3/UL (ref 0.1–0.9)
MONOCYTES NFR BLD AUTO: 6 %
NEUTROPHILS # BLD AUTO: 3.6 X10E3/UL (ref 1.4–7)
NEUTROPHILS NFR BLD AUTO: 64 %
PLATELET # BLD AUTO: 275 X10E3/UL (ref 150–450)
POTASSIUM SERPL-SCNC: 4.1 MMOL/L (ref 3.5–5.2)
PROT SERPL-MCNC: 6.6 G/DL (ref 6–8.5)
RBC # BLD AUTO: 4.39 X10E6/UL (ref 3.77–5.28)
SODIUM SERPL-SCNC: 141 MMOL/L (ref 134–144)
TRIGL SERPL-MCNC: 112 MG/DL (ref 0–149)
TSH SERPL DL<=0.005 MIU/L-ACNC: 2.61 UIU/ML (ref 0.45–4.5)
VLDLC SERPL CALC-MCNC: 20 MG/DL (ref 5–40)
WBC # BLD AUTO: 5.6 X10E3/UL (ref 3.4–10.8)

## 2024-08-12 DIAGNOSIS — R11.2 NAUSEA AND VOMITING, UNSPECIFIED VOMITING TYPE: ICD-10-CM

## 2024-08-12 RX ORDER — PANTOPRAZOLE SODIUM 40 MG/1
40 TABLET, DELAYED RELEASE ORAL
Qty: 90 TABLET | Refills: 1 | Status: SHIPPED | OUTPATIENT
Start: 2024-08-12

## 2024-08-15 LAB
BARLEY IGE QN: <0.1 KU/L
BEEF IGE QN: <0.1 KU/L
CABBAGE IGE QN: <0.1 KU/L
CARROT IGE QN: <0.1 KU/L
CHICKEN MEAT IGE QN: <0.1 KU/L
CODFISH IGE QN: <0.1 KU/L
CORN IGE QN: <0.1 KU/L
COW MILK IGE QN: <0.1 KU/L
CRAB IGE QN: 0.2 KU/L
EGG WHITE IGE QN: <0.1 KU/L
GRAPE IGE QN: <0.1 KU/L
GREEN PEPPER IGE QN: <0.1 KU/L
LETTUCE IGE QN: <0.1 KU/L
Lab: ABNORMAL
OAT IGE QN: <0.1 KU/L
ORANGE IGE QN: <0.1 KU/L
PEANUT IGE QN: <0.1 KU/L
PORK IGE QN: <0.1 KU/L
POTATO IGE QN: <0.1 KU/L
RICE IGE QN: <0.1 KU/L
RYE IGE QN: <0.1 KU/L
SHRIMP IGE QN: 0.41 KU/L
SOYBEAN IGE QN: <0.1 KU/L
TOMATO IGE QN: <0.1 KU/L
TUNA IGE QN: <0.1 KU/L
WHEAT IGE QN: <0.1 KU/L
WHITE BEAN IGE QN: <0.1 KU/L

## 2024-10-25 ENCOUNTER — TELEPHONE (OUTPATIENT)
Facility: CLINIC | Age: 29
End: 2024-10-25

## 2024-10-25 NOTE — TELEPHONE ENCOUNTER
Called and spoke to pt. She has a pre-op appt set up with Dr. Adam on 10/29/2024.  I informed pt we should be able to do EKG at that time.

## 2024-10-25 NOTE — TELEPHONE ENCOUNTER
Patient has surgery in November, they are requesting an EKG to be done. Patient wants to know where to go for that and what she needs to do.

## 2024-10-29 ENCOUNTER — OFFICE VISIT (OUTPATIENT)
Facility: CLINIC | Age: 29
End: 2024-10-29
Payer: COMMERCIAL

## 2024-10-29 VITALS
BODY MASS INDEX: 37.56 KG/M2 | HEART RATE: 82 BPM | RESPIRATION RATE: 16 BRPM | OXYGEN SATURATION: 98 % | DIASTOLIC BLOOD PRESSURE: 80 MMHG | WEIGHT: 220 LBS | HEIGHT: 64 IN | TEMPERATURE: 98 F | SYSTOLIC BLOOD PRESSURE: 115 MMHG

## 2024-10-29 DIAGNOSIS — F41.8 MIXED ANXIETY AND DEPRESSIVE DISORDER: ICD-10-CM

## 2024-10-29 DIAGNOSIS — I10 ESSENTIAL HYPERTENSION, BENIGN: ICD-10-CM

## 2024-10-29 DIAGNOSIS — Z01.818 PRE-OPERATIVE CLEARANCE: Primary | ICD-10-CM

## 2024-10-29 DIAGNOSIS — F31.9 BIPOLAR 1 DISORDER, DEPRESSED (HCC): ICD-10-CM

## 2024-10-29 DIAGNOSIS — K58.2 MIXED IRRITABLE BOWEL SYNDROME: ICD-10-CM

## 2024-10-29 PROCEDURE — 3074F SYST BP LT 130 MM HG: CPT | Performed by: INTERNAL MEDICINE

## 2024-10-29 PROCEDURE — 3079F DIAST BP 80-89 MM HG: CPT | Performed by: INTERNAL MEDICINE

## 2024-10-29 PROCEDURE — 99214 OFFICE O/P EST MOD 30 MIN: CPT | Performed by: INTERNAL MEDICINE

## 2024-10-29 RX ORDER — METHYLPHENIDATE HYDROCHLORIDE 40 MG/1
40 CAPSULE, EXTENDED RELEASE ORAL EVERY MORNING
COMMUNITY

## 2024-10-29 ASSESSMENT — ENCOUNTER SYMPTOMS
VOMITING: 0
COUGH: 0
SHORTNESS OF BREATH: 0
NAUSEA: 0
DIARRHEA: 0
ABDOMINAL PAIN: 0

## 2024-10-29 NOTE — PROGRESS NOTES
[facility]  [address], [place] [address]  Phone: [phone]      Shalonda Gann (: 1995) is a 29 y.o. female, established patient, here for evaluation of the following chief complaint(s):  Pre-op Exam (Sinus surgery)         SUBJECTIVE/OBJECTIVE:  History of Present Illness  The patient is a 29-year-old female with a past medical history of hypertension, IgA deficiency, nodular lymphoid hyperplasia, irritable bowel syndrome with constipation and diarrhea, tachycardia, and depressive disorder. She is here for a 3-month follow-up and preoperative clearance for sinus surgery nasal polyposis.    She has been experiencing nasal issues for several years and is scheduled for sinus surgery on 2024, to be performed by Dr. Myles.  She has no prior surgical history. Her last menstrual period was on 10/04/2024.     She is currently taking labetalol three times a day, which appears to be effective as her blood pressure is within the normal range.    She had a colonoscopy a couple of weeks ago. She is no longer taking Adderall. Her current medications include Depakote, Wellbutrin, Lexapro, and Zyrtec for allergies. She alternates between Zyrtec, Claritin, and Allegra. She is actively exercising and is seeing both a psychiatrist and a therapist. She does not have sleep apnea and is trying to manage her ADD.    FAMILY HISTORY  She denies any family history of malignant hyperthermia, bleeding disorder or clotting disorder.    ALLERGIES  She denies any allergies to any medicine or latex. She is allergic to SHRIMP and CRAB by blood test         Hemoglobin A1C   Date Value Ref Range Status   2024 5.0 4.8 - 5.6 % Final     Comment:                 Prediabetes: 5.7 - 6.4           Diabetes: >6.4           Glycemic control for adults with diabetes: <7.0        Hemoglobin   Date Value Ref Range Status   2024 13.1 11.1 - 15.9 g/dL Final     Hematocrit

## 2024-10-29 NOTE — PROGRESS NOTES
\"Have you been to the ER, urgent care clinic since your last visit?  Hospitalized since your last visit?\"    NO    “Have you seen or consulted any other health care providers outside our system since your last visit?”    YES - When: approximately 6  weeks ago.  Where and Why: [facility] .  Dr. Hartman

## 2024-11-18 DIAGNOSIS — I10 ESSENTIAL HYPERTENSION, BENIGN: ICD-10-CM

## 2024-11-18 RX ORDER — LABETALOL 100 MG/1
100 TABLET, FILM COATED ORAL 3 TIMES DAILY
Qty: 270 TABLET | Refills: 1 | Status: SHIPPED | OUTPATIENT
Start: 2024-11-18

## 2025-02-17 ENCOUNTER — OFFICE VISIT (OUTPATIENT)
Facility: CLINIC | Age: 30
End: 2025-02-17
Payer: COMMERCIAL

## 2025-02-17 VITALS
SYSTOLIC BLOOD PRESSURE: 120 MMHG | HEART RATE: 64 BPM | BODY MASS INDEX: 38.21 KG/M2 | OXYGEN SATURATION: 98 % | RESPIRATION RATE: 18 BRPM | HEIGHT: 64 IN | DIASTOLIC BLOOD PRESSURE: 84 MMHG | WEIGHT: 223.8 LBS | TEMPERATURE: 97.9 F

## 2025-02-17 DIAGNOSIS — I10 ESSENTIAL HYPERTENSION, BENIGN: Primary | ICD-10-CM

## 2025-02-17 DIAGNOSIS — I10 ESSENTIAL HYPERTENSION, BENIGN: ICD-10-CM

## 2025-02-17 DIAGNOSIS — E78.00 HYPERCHOLESTEROLEMIA: ICD-10-CM

## 2025-02-17 DIAGNOSIS — K58.2 IRRITABLE BOWEL SYNDROME WITH BOTH CONSTIPATION AND DIARRHEA: ICD-10-CM

## 2025-02-17 DIAGNOSIS — F31.9 BIPOLAR 1 DISORDER, DEPRESSED (HCC): ICD-10-CM

## 2025-02-17 DIAGNOSIS — R59.9 NODULAR LYMPHOID HYPERPLASIA: ICD-10-CM

## 2025-02-17 DIAGNOSIS — F41.8 MIXED ANXIETY AND DEPRESSIVE DISORDER: ICD-10-CM

## 2025-02-17 DIAGNOSIS — D80.2 IGA DEFICIENCY (HCC): ICD-10-CM

## 2025-02-17 PROCEDURE — 3079F DIAST BP 80-89 MM HG: CPT | Performed by: INTERNAL MEDICINE

## 2025-02-17 PROCEDURE — 99214 OFFICE O/P EST MOD 30 MIN: CPT | Performed by: INTERNAL MEDICINE

## 2025-02-17 PROCEDURE — 3074F SYST BP LT 130 MM HG: CPT | Performed by: INTERNAL MEDICINE

## 2025-02-17 SDOH — ECONOMIC STABILITY: FOOD INSECURITY: WITHIN THE PAST 12 MONTHS, THE FOOD YOU BOUGHT JUST DIDN'T LAST AND YOU DIDN'T HAVE MONEY TO GET MORE.: NEVER TRUE

## 2025-02-17 SDOH — ECONOMIC STABILITY: FOOD INSECURITY: WITHIN THE PAST 12 MONTHS, YOU WORRIED THAT YOUR FOOD WOULD RUN OUT BEFORE YOU GOT MONEY TO BUY MORE.: NEVER TRUE

## 2025-02-17 ASSESSMENT — PATIENT HEALTH QUESTIONNAIRE - PHQ9
SUM OF ALL RESPONSES TO PHQ QUESTIONS 1-9: 8
3. TROUBLE FALLING OR STAYING ASLEEP: MORE THAN HALF THE DAYS
7. TROUBLE CONCENTRATING ON THINGS, SUCH AS READING THE NEWSPAPER OR WATCHING TELEVISION: NOT AT ALL
SUM OF ALL RESPONSES TO PHQ QUESTIONS 1-9: 8
4. FEELING TIRED OR HAVING LITTLE ENERGY: SEVERAL DAYS
6. FEELING BAD ABOUT YOURSELF - OR THAT YOU ARE A FAILURE OR HAVE LET YOURSELF OR YOUR FAMILY DOWN: NOT AT ALL
SUM OF ALL RESPONSES TO PHQ9 QUESTIONS 1 & 2: 4
8. MOVING OR SPEAKING SO SLOWLY THAT OTHER PEOPLE COULD HAVE NOTICED. OR THE OPPOSITE, BEING SO FIGETY OR RESTLESS THAT YOU HAVE BEEN MOVING AROUND A LOT MORE THAN USUAL: NOT AT ALL
SUM OF ALL RESPONSES TO PHQ QUESTIONS 1-9: 8
9. THOUGHTS THAT YOU WOULD BE BETTER OFF DEAD, OR OF HURTING YOURSELF: NOT AT ALL
SUM OF ALL RESPONSES TO PHQ QUESTIONS 1-9: 8
1. LITTLE INTEREST OR PLEASURE IN DOING THINGS: MORE THAN HALF THE DAYS
10. IF YOU CHECKED OFF ANY PROBLEMS, HOW DIFFICULT HAVE THESE PROBLEMS MADE IT FOR YOU TO DO YOUR WORK, TAKE CARE OF THINGS AT HOME, OR GET ALONG WITH OTHER PEOPLE: VERY DIFFICULT
5. POOR APPETITE OR OVEREATING: SEVERAL DAYS
2. FEELING DOWN, DEPRESSED OR HOPELESS: MORE THAN HALF THE DAYS

## 2025-02-17 ASSESSMENT — ENCOUNTER SYMPTOMS
VOMITING: 0
SHORTNESS OF BREATH: 0
COUGH: 0
ABDOMINAL PAIN: 0
DIARRHEA: 0
NAUSEA: 0

## 2025-02-17 NOTE — PROGRESS NOTES
[facility]  [address], [place] [address]  Phone: [phone]      Shalonda Gann (: 1995) is a 29 y.o. female, established patient, here for evaluation of the following chief complaint(s):  Follow-up Chronic Condition         SUBJECTIVE/OBJECTIVE:  History of Present Illness  The patient is a 29-year-old female with a past medical history of hypertension, IgA deficiency, nodular lymphoid hyperplasia, irritable bowel syndrome with constipation and diarrhea, tachycardia, anxiety, and depression, here for a 4-month follow-up.    She reports satisfactory blood pressure control.    She has been experiencing weight fluctuations, which she attributes to a sedentary lifestyle and a diet low in fiber and high in sodium and calories. She acknowledges the need for increased physical activity but expresses concern about potential gastrointestinal disturbances during exercise. She continues to work from home and has made efforts to reduce her soda intake, which previously exceeded her water consumption. She has been attempting to conceive for several years, with a suspected diagnosis of polycystic ovary syndrome (PCOS) and uncertain ovulation patterns. Her last menstrual cycle started on 2025. She has not received her influenza vaccine due to a recent healing period following her mother's surgery.    She has been under the care of Dr. Amadou Davis for psychiatric management. She was informed about the potential weight gain associated with Depakote, but it remains the only medication that has provided significant relief. She has tried 2 or 3 other medications before this one. She reports a weight increase from 200 to 220 pounds since starting Depakote. She experiences headaches when reducing her sugar intake too rapidly, which she believes is due to a sugar addiction developed in childhood.    She has been experiencing finger stiffness, particularly in

## 2025-02-17 NOTE — PROGRESS NOTES
Chief Complaint   Patient presents with    Follow-up Chronic Condition     /84 (Site: Right Upper Arm, Position: Sitting)   Pulse 64   Temp 97.9 °F (36.6 °C) (Oral)   Resp 18   Ht 1.626 m (5' 4.02\")   Wt 101.5 kg (223 lb 12.8 oz)   LMP 02/02/2025   SpO2 98%   BMI 38.40 kg/m²       \"Have you been to the ER, urgent care clinic since your last visit?  Hospitalized since your last visit?\"    NO    “Have you seen or consulted any other health care providers outside our system since your last visit?”    Yes Dr Jha February 7th

## 2025-04-07 ENCOUNTER — TELEMEDICINE (OUTPATIENT)
Facility: CLINIC | Age: 30
End: 2025-04-07
Payer: COMMERCIAL

## 2025-04-07 DIAGNOSIS — F41.8 MIXED ANXIETY AND DEPRESSIVE DISORDER: ICD-10-CM

## 2025-04-07 DIAGNOSIS — I10 ESSENTIAL HYPERTENSION, BENIGN: ICD-10-CM

## 2025-04-07 DIAGNOSIS — K58.2 IRRITABLE BOWEL SYNDROME WITH BOTH CONSTIPATION AND DIARRHEA: Primary | ICD-10-CM

## 2025-04-07 PROCEDURE — 99213 OFFICE O/P EST LOW 20 MIN: CPT | Performed by: INTERNAL MEDICINE

## 2025-04-07 ASSESSMENT — PATIENT HEALTH QUESTIONNAIRE - PHQ9
SUM OF ALL RESPONSES TO PHQ QUESTIONS 1-9: 1
SUM OF ALL RESPONSES TO PHQ QUESTIONS 1-9: 1
1. LITTLE INTEREST OR PLEASURE IN DOING THINGS: NOT AT ALL
2. FEELING DOWN, DEPRESSED OR HOPELESS: SEVERAL DAYS
SUM OF ALL RESPONSES TO PHQ QUESTIONS 1-9: 1
SUM OF ALL RESPONSES TO PHQ QUESTIONS 1-9: 1

## 2025-04-07 ASSESSMENT — ENCOUNTER SYMPTOMS
ABDOMINAL PAIN: 0
COUGH: 0
SHORTNESS OF BREATH: 0
NAUSEA: 0
VOMITING: 0
DIARRHEA: 0

## 2025-04-07 NOTE — PROGRESS NOTES
Chief Complaint   Patient presents with    Other     Wants to go over paperwork.         \"Have you been to the ER, urgent care clinic since your last visit?  Hospitalized since your last visit?\"    NO    “Have you seen or consulted any other health care providers outside our system since your last visit?”    NO

## 2025-04-07 NOTE — PROGRESS NOTES
Shalonda Gann (: 1995) is a 29 y.o. female, patient, here for evaluation of the following chief complaint(s):   Other (Wants to go over paperwork.)         SUBJECTIVE/OBJECTIVE:  History of Present Illness  The patient is a 29-year-old female with a past medical history of IgA deficiency, hypertension, nodular lymphoid hyperplasia, irritable bowel syndrome with constipation and diarrhea, anxiety, and depression. She is seen today via a video visit to fill out [facility] papers.    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 During flare-ups, she experiences difficulty concentrating and prolonged sitting due to frequent bathroom visits. She has requested additional unscheduled 15-minute breaks to accommodate these needs. Despite these challenges, she believes she can continue her work with appropriate accommodations.    Her blood pressure is well-managed, with the most recent reading being 138/87 approximately one week ago. She continues her medication regimen, which includes labetalol, pantoprazole, Wellbutrin 300 mg, Depakote 750 mg, and methylphenidate. She has discontinued Singulair and is now on a different allergy medication.       No Known Allergies  Current Outpatient Medications   Medication Sig Dispense Refill    labetalol (NORMODYNE) 100 MG tablet TAKE 1 TABLET BY MOUTH THREE TIMES A  tablet 1    methylphenidate (METADATE CD) 40 MG extended release capsule Take 1 capsule by mouth every morning.      pantoprazole (PROTONIX) 40 MG tablet TAKE 1 TABLET BY MOUTH EVERY DAY BEFORE BREAKFAST 90 tablet 1    buPROPion (WELLBUTRIN XL) 300 MG extended release tablet Take 1 tablet by mouth every morning      escitalopram (LEXAPRO) 10 MG tablet Take 2 tablets by mouth daily      Cetirizine HCl (ZYRTEC PO) Take by mouth      divalproex (DEPAKOTE ER) 250 MG extended release tablet Take 3 tablets by mouth

## 2025-04-09 ENCOUNTER — PATIENT MESSAGE (OUTPATIENT)
Facility: CLINIC | Age: 30
End: 2025-04-09

## 2025-07-03 ENCOUNTER — TELEPHONE (OUTPATIENT)
Facility: CLINIC | Age: 30
End: 2025-07-03

## 2025-07-03 NOTE — TELEPHONE ENCOUNTER
----- Message from Wilda V sent at 7/3/2025  9:53 AM EDT -----  Regarding: [facility] Appointment Request  [facility] Appointment Request    Patient needs appointment for [facility] Appointment Type: Existing Condition Follow Up.    Patient Requested Dates(s): Beginning of August 2025  Patient Requested Time: Any time  Provider Name: Stefan Mcfadden MD    Reason for Appointment Request: Established Patient - Available appointments did not meet patient need    The patient needed an appointment for her 4 months follow up  --------------------------------------------------------------------------------------------------------------------------    Relationship to Patient: Self     Call Back Information: OK to leave message on voicemail  Preferred Call Back Number: [phone]

## 2025-07-08 ENCOUNTER — PATIENT MESSAGE (OUTPATIENT)
Facility: CLINIC | Age: 30
End: 2025-07-08

## 2025-07-31 ENCOUNTER — OFFICE VISIT (OUTPATIENT)
Facility: CLINIC | Age: 30
End: 2025-07-31
Payer: COMMERCIAL

## 2025-07-31 VITALS
DIASTOLIC BLOOD PRESSURE: 87 MMHG | HEART RATE: 58 BPM | TEMPERATURE: 98.8 F | OXYGEN SATURATION: 98 % | WEIGHT: 230 LBS | HEIGHT: 64 IN | BODY MASS INDEX: 39.27 KG/M2 | RESPIRATION RATE: 16 BRPM | SYSTOLIC BLOOD PRESSURE: 135 MMHG

## 2025-07-31 DIAGNOSIS — K58.2 IRRITABLE BOWEL SYNDROME WITH BOTH CONSTIPATION AND DIARRHEA: ICD-10-CM

## 2025-07-31 DIAGNOSIS — F31.9 BIPOLAR 1 DISORDER, DEPRESSED (HCC): ICD-10-CM

## 2025-07-31 DIAGNOSIS — E78.00 HYPERCHOLESTEROLEMIA: ICD-10-CM

## 2025-07-31 DIAGNOSIS — E55.9 VITAMIN D DEFICIENCY: ICD-10-CM

## 2025-07-31 DIAGNOSIS — I10 ESSENTIAL HYPERTENSION, BENIGN: Primary | ICD-10-CM

## 2025-07-31 DIAGNOSIS — I10 ESSENTIAL HYPERTENSION, BENIGN: ICD-10-CM

## 2025-07-31 PROCEDURE — 99214 OFFICE O/P EST MOD 30 MIN: CPT | Performed by: INTERNAL MEDICINE

## 2025-07-31 PROCEDURE — 3079F DIAST BP 80-89 MM HG: CPT | Performed by: INTERNAL MEDICINE

## 2025-07-31 PROCEDURE — 3075F SYST BP GE 130 - 139MM HG: CPT | Performed by: INTERNAL MEDICINE

## 2025-07-31 ASSESSMENT — ENCOUNTER SYMPTOMS
DIARRHEA: 0
COUGH: 0
NAUSEA: 0
ABDOMINAL PAIN: 0
SHORTNESS OF BREATH: 0
VOMITING: 0

## 2025-07-31 NOTE — PROGRESS NOTES
Chief Complaint   Patient presents with    Follow-up    Hypertension    Medication Refill    Other     Paperwork for accomodation for work     BP (!) 138/94 (BP Site: Right Upper Arm, Patient Position: Sitting, BP Cuff Size: Large Adult)   Pulse 99   Temp 98.8 °F (37.1 °C) (Oral)   Resp 16   Ht 1.626 m (5' 4\")   Wt 104.3 kg (230 lb)   LMP 07/31/2025   SpO2 98%   BMI 39.48 kg/m²       Have you been to the ER, urgent care clinic since your last visit?  Hospitalized since your last visit?   NO    Have you seen or consulted any other health care providers outside our system since your last visit?   NO

## 2025-07-31 NOTE — PROGRESS NOTES
[facility]  [address], [place] [address]  Phone: [phone]      Shalonda Gann (: 1995) is a 30 y.o. female, established patient, here for evaluation of the following chief complaint(s):  Follow-up, Hypertension, Medication Refill, and Other (Paperwork for accomodation for work)         SUBJECTIVE/OBJECTIVE:  History of Present Illness  The patient is a 30-year-old female with a past medical history of IgA deficiency, hypertension, nodular lymphoid hyperplasia, irritable bowel syndrome with constipation and diarrhea, anxiety, and depression, here for a 3-month follow-up.    She reports an improvement in her condition, attributing it to increased food intake due to a better-functioning stomach. She acknowledges the need for more intense exercise, as her current regimen only includes walking. She has been overeating because her stomach has been doing a little better.     Her blood pressure is elevated today, which she believes may be due to recent stress. She is currently on labetalol 100 mg three times a day for blood pressure management.    She has been experiencing fluctuations in her mood, with periods of depression. She is under the care of Dr. Amadou Garcia, a psychiatrist, whom she sees once a month. She is on Depakote for mood stabilization.    She has been diagnosed with bipolar disorder with depression since 2018. She is on Wellbutrin 300 mg and Lexapro 20 mg.    She has been diagnosed with irritable bowel syndrome with constipation and diarrhea. She experiences frequent flare-ups of her condition, which require her to take additional breaks from work. These episodes can last up to 3 days and occur approximately once every 3 months. She works 8-hour shifts and estimates that she needs 7 days off per month to manage her condition. She also requires two 10-minute breaks during her shift.  She believes that frequent breaks would help her

## 2025-08-31 LAB
ALBUMIN SERPL-MCNC: 4.4 G/DL (ref 3.9–4.9)
ALP SERPL-CCNC: 76 IU/L (ref 44–121)
ALT SERPL-CCNC: 9 IU/L (ref 0–32)
AST SERPL-CCNC: 8 IU/L (ref 0–40)
BASOPHILS # BLD AUTO: 0 X10E3/UL (ref 0–0.2)
BASOPHILS NFR BLD AUTO: 1 %
BILIRUB SERPL-MCNC: 0.6 MG/DL (ref 0–1.2)
BUN SERPL-MCNC: 14 MG/DL (ref 6–20)
BUN/CREAT SERPL: 18 (ref 9–23)
CALCIUM SERPL-MCNC: 9.6 MG/DL (ref 8.7–10.2)
CHLORIDE SERPL-SCNC: 105 MMOL/L (ref 96–106)
CHOLEST SERPL-MCNC: 170 MG/DL (ref 100–199)
CO2 SERPL-SCNC: 20 MMOL/L (ref 20–29)
CREAT SERPL-MCNC: 0.78 MG/DL (ref 0.57–1)
EGFRCR SERPLBLD CKD-EPI 2021: 104 ML/MIN/1.73
EOSINOPHIL # BLD AUTO: 0.1 X10E3/UL (ref 0–0.4)
EOSINOPHIL NFR BLD AUTO: 2 %
ERYTHROCYTE [DISTWIDTH] IN BLOOD BY AUTOMATED COUNT: 12.6 % (ref 11.7–15.4)
EST. AVERAGE GLUCOSE BLD GHB EST-MCNC: 97 MG/DL
GLOBULIN SER CALC-MCNC: 2.3 G/DL (ref 1.5–4.5)
GLUCOSE SERPL-MCNC: 95 MG/DL (ref 70–99)
HBA1C MFR BLD: 5 % (ref 4.8–5.6)
HCT VFR BLD AUTO: 40.7 % (ref 34–46.6)
HDLC SERPL-MCNC: 42 MG/DL
HGB BLD-MCNC: 13.1 G/DL (ref 11.1–15.9)
IMM GRANULOCYTES # BLD AUTO: 0 X10E3/UL (ref 0–0.1)
IMM GRANULOCYTES NFR BLD AUTO: 0 %
LDLC SERPL CALC-MCNC: 109 MG/DL (ref 0–99)
LYMPHOCYTES # BLD AUTO: 1.6 X10E3/UL (ref 0.7–3.1)
LYMPHOCYTES NFR BLD AUTO: 25 %
MCH RBC QN AUTO: 29.3 PG (ref 26.6–33)
MCHC RBC AUTO-ENTMCNC: 32.2 G/DL (ref 31.5–35.7)
MCV RBC AUTO: 91 FL (ref 79–97)
MONOCYTES # BLD AUTO: 0.3 X10E3/UL (ref 0.1–0.9)
MONOCYTES NFR BLD AUTO: 5 %
NEUTROPHILS # BLD AUTO: 4.4 X10E3/UL (ref 1.4–7)
NEUTROPHILS NFR BLD AUTO: 67 %
PLATELET # BLD AUTO: 330 X10E3/UL (ref 150–450)
POTASSIUM SERPL-SCNC: 4.7 MMOL/L (ref 3.5–5.2)
PROT SERPL-MCNC: 6.7 G/DL (ref 6–8.5)
RBC # BLD AUTO: 4.47 X10E6/UL (ref 3.77–5.28)
SODIUM SERPL-SCNC: 138 MMOL/L (ref 134–144)
TRIGL SERPL-MCNC: 103 MG/DL (ref 0–149)
TSH SERPL DL<=0.005 MIU/L-ACNC: 3.37 UIU/ML (ref 0.45–4.5)
VLDLC SERPL CALC-MCNC: 19 MG/DL (ref 5–40)
WBC # BLD AUTO: 6.6 X10E3/UL (ref 3.4–10.8)

## 2025-09-01 LAB — 25(OH)D3+25(OH)D2 SERPL-MCNC: 12.9 NG/ML (ref 30–100)
